# Patient Record
Sex: FEMALE | NOT HISPANIC OR LATINO | URBAN - METROPOLITAN AREA
[De-identification: names, ages, dates, MRNs, and addresses within clinical notes are randomized per-mention and may not be internally consistent; named-entity substitution may affect disease eponyms.]

---

## 2017-05-03 ENCOUNTER — APPOINTMENT (RX ONLY)
Dept: URBAN - METROPOLITAN AREA CLINIC 126 | Facility: CLINIC | Age: 37
Setting detail: DERMATOLOGY
End: 2017-05-03

## 2017-05-03 DIAGNOSIS — Z02.9 ENCOUNTER FOR ADMINISTRATIVE EXAMINATIONS, UNSPECIFIED: ICD-10-CM

## 2017-05-03 PROCEDURE — 99025: CPT

## 2022-03-30 ENCOUNTER — OFFICE VISIT (OUTPATIENT)
Dept: URBAN - METROPOLITAN AREA CLINIC 9 | Facility: CLINIC | Age: 42
End: 2022-03-30

## 2022-06-27 ENCOUNTER — OFFICE VISIT (OUTPATIENT)
Dept: URBAN - METROPOLITAN AREA CLINIC 9 | Facility: CLINIC | Age: 42
End: 2022-06-27

## 2022-06-27 ENCOUNTER — TELEPHONE ENCOUNTER (OUTPATIENT)
Dept: URBAN - METROPOLITAN AREA CLINIC 9 | Facility: CLINIC | Age: 42
End: 2022-06-27

## 2022-07-30 ENCOUNTER — TELEPHONE ENCOUNTER (OUTPATIENT)
Age: 42
End: 2022-07-30

## 2022-07-31 ENCOUNTER — TELEPHONE ENCOUNTER (OUTPATIENT)
Age: 42
End: 2022-07-31

## 2023-03-01 ENCOUNTER — APPOINTMENT (RX ONLY)
Dept: URBAN - METROPOLITAN AREA CLINIC 119 | Facility: CLINIC | Age: 43
Setting detail: DERMATOLOGY
End: 2023-03-01

## 2023-03-01 DIAGNOSIS — Z80.8 FAMILY HISTORY OF MALIGNANT NEOPLASM OF OTHER ORGANS OR SYSTEMS: ICD-10-CM

## 2023-03-01 DIAGNOSIS — L82.1 OTHER SEBORRHEIC KERATOSIS: ICD-10-CM

## 2023-03-01 DIAGNOSIS — D22 MELANOCYTIC NEVI: ICD-10-CM

## 2023-03-01 DIAGNOSIS — D18.0 HEMANGIOMA: ICD-10-CM

## 2023-03-01 DIAGNOSIS — Z85.820 PERSONAL HISTORY OF MALIGNANT MELANOMA OF SKIN: ICD-10-CM

## 2023-03-01 DIAGNOSIS — L82.0 INFLAMED SEBORRHEIC KERATOSIS: ICD-10-CM

## 2023-03-01 DIAGNOSIS — Z85.828 PERSONAL HISTORY OF OTHER MALIGNANT NEOPLASM OF SKIN: ICD-10-CM

## 2023-03-01 DIAGNOSIS — L57.8 OTHER SKIN CHANGES DUE TO CHRONIC EXPOSURE TO NONIONIZING RADIATION: ICD-10-CM

## 2023-03-01 DIAGNOSIS — L81.4 OTHER MELANIN HYPERPIGMENTATION: ICD-10-CM

## 2023-03-01 PROBLEM — D18.01 HEMANGIOMA OF SKIN AND SUBCUTANEOUS TISSUE: Status: ACTIVE | Noted: 2023-03-01

## 2023-03-01 PROBLEM — D22.9 MELANOCYTIC NEVI, UNSPECIFIED: Status: ACTIVE | Noted: 2023-03-01

## 2023-03-01 PROCEDURE — ? COUNSELING

## 2023-03-01 PROCEDURE — ? SUNSCREEN RECOMMENDATIONS

## 2023-03-01 PROCEDURE — ? LIQUID NITROGEN

## 2023-03-01 PROCEDURE — 99203 OFFICE O/P NEW LOW 30 MIN: CPT | Mod: 25

## 2023-03-01 PROCEDURE — 17110 DESTRUCTION B9 LES UP TO 14: CPT

## 2023-03-01 ASSESSMENT — LOCATION SIMPLE DESCRIPTION DERM
LOCATION SIMPLE: LEFT UPPER BACK
LOCATION SIMPLE: LEFT FOREHEAD
LOCATION SIMPLE: UPPER BACK
LOCATION SIMPLE: RIGHT SHOULDER
LOCATION SIMPLE: RIGHT UPPER BACK
LOCATION SIMPLE: RIGHT CHEEK
LOCATION SIMPLE: CHEST
LOCATION SIMPLE: LEFT SHOULDER

## 2023-03-01 ASSESSMENT — LOCATION DETAILED DESCRIPTION DERM
LOCATION DETAILED: RIGHT POSTERIOR SHOULDER
LOCATION DETAILED: LEFT MEDIAL UPPER BACK
LOCATION DETAILED: LEFT POSTERIOR SHOULDER
LOCATION DETAILED: INFERIOR THORACIC SPINE
LOCATION DETAILED: RIGHT MEDIAL UPPER BACK
LOCATION DETAILED: LEFT FOREHEAD
LOCATION DETAILED: STERNAL NOTCH
LOCATION DETAILED: RIGHT MEDIAL MALAR CHEEK

## 2023-03-01 ASSESSMENT — LOCATION ZONE DERM
LOCATION ZONE: TRUNK
LOCATION ZONE: TRUNK
LOCATION ZONE: ARM
LOCATION ZONE: FACE

## 2023-03-01 NOTE — PROCEDURE: LIQUID NITROGEN
Consent: The patient's consent was obtained including but not limited to risks of crusting, scabbing, blistering, scarring, darker or lighter pigmentary change, recurrence, incomplete removal and infection.
Post-Care Instructions: I reviewed with the patient in detail post-care instructions. Patient is to wear sunprotection, and avoid picking at any of the treated lesions. Pt may apply Vaseline to crusted or scabbing areas.
Include Z78.9 (Other Specified Conditions Influencing Health Status) As An Associated Diagnosis?: Yes
Spray Paint Technique: No
Medical Necessity Clause: This procedure was medically necessary because the lesions that were treated were:
Number Of Freeze-Thaw Cycles: 2 freeze-thaw cycles
Application Tool (Optional): Liquid Nitrogen Sprayer
Spray Paint Text: The liquid nitrogen was applied to the skin utilizing a spray paint frosting technique.
Detail Level: Detailed
Duration Of Freeze Thaw-Cycle (Seconds): 5-10
Medical Necessity Information: It is in your best interest to select a reason for this procedure from the list below. All of these items fulfill various CMS LCD requirements except the new and changing color options.

## 2024-03-21 ENCOUNTER — APPOINTMENT (RX ONLY)
Dept: URBAN - METROPOLITAN AREA CLINIC 119 | Facility: CLINIC | Age: 44
Setting detail: DERMATOLOGY
End: 2024-03-21

## 2024-03-21 DIAGNOSIS — L82.1 OTHER SEBORRHEIC KERATOSIS: ICD-10-CM

## 2024-03-21 DIAGNOSIS — D49.2 NEOPLASM OF UNSPECIFIED BEHAVIOR OF BONE, SOFT TISSUE, AND SKIN: ICD-10-CM

## 2024-03-21 PROCEDURE — 99213 OFFICE O/P EST LOW 20 MIN: CPT

## 2024-03-21 PROCEDURE — ? OBSERVATION

## 2024-03-21 PROCEDURE — ? COUNSELING

## 2024-03-21 ASSESSMENT — LOCATION ZONE DERM
LOCATION ZONE: TRUNK
LOCATION ZONE: NECK

## 2024-03-21 ASSESSMENT — LOCATION SIMPLE DESCRIPTION DERM
LOCATION SIMPLE: LEFT CLAVICULAR SKIN
LOCATION SIMPLE: POSTERIOR NECK
LOCATION SIMPLE: RIGHT CLAVICULAR SKIN

## 2024-03-21 ASSESSMENT — LOCATION DETAILED DESCRIPTION DERM
LOCATION DETAILED: LEFT CLAVICULAR SKIN
LOCATION DETAILED: RIGHT CLAVICULAR SKIN
LOCATION DETAILED: LEFT SUPERIOR POSTERIOR NECK
LOCATION DETAILED: LEFT POSTERIOR NECK

## 2024-03-21 NOTE — PROCEDURE: COUNSELING
Detail Level: Simple
Patient Specific Counseling (Will Not Stick From Patient To Patient): The differential diagnosis includes a benign mole or a seborrheic keratosis.\\nShave biopsy offered but patient declined and prefers to follow clinically.

## 2024-05-21 ENCOUNTER — APPOINTMENT (RX ONLY)
Dept: URBAN - METROPOLITAN AREA CLINIC 128 | Facility: CLINIC | Age: 44
Setting detail: DERMATOLOGY
End: 2024-05-21

## 2024-05-21 DIAGNOSIS — L82.1 OTHER SEBORRHEIC KERATOSIS: ICD-10-CM

## 2024-05-21 PROCEDURE — ? MEDLITE C6

## 2024-05-21 ASSESSMENT — LOCATION SIMPLE DESCRIPTION DERM
LOCATION SIMPLE: LEFT UPPER BACK
LOCATION SIMPLE: UPPER BACK
LOCATION SIMPLE: LEFT ANTERIOR NECK
LOCATION SIMPLE: RIGHT UPPER BACK
LOCATION SIMPLE: LEFT FOREHEAD
LOCATION SIMPLE: RIGHT SHOULDER
LOCATION SIMPLE: POSTERIOR NECK
LOCATION SIMPLE: LEFT SHOULDER
LOCATION SIMPLE: RIGHT BACK
LOCATION SIMPLE: TRAPEZIAL NECK

## 2024-05-21 ASSESSMENT — LOCATION DETAILED DESCRIPTION DERM
LOCATION DETAILED: LEFT POSTERIOR SHOULDER
LOCATION DETAILED: LEFT SUPERIOR UPPER BACK
LOCATION DETAILED: RIGHT MID-UPPER BACK
LOCATION DETAILED: LEFT CLAVICULAR NECK
LOCATION DETAILED: RIGHT SUPERIOR MEDIAL UPPER BACK
LOCATION DETAILED: LEFT ANTERIOR SHOULDER
LOCATION DETAILED: MID TRAPEZIAL NECK
LOCATION DETAILED: LEFT MEDIAL TRAPEZIAL NECK
LOCATION DETAILED: RIGHT POSTERIOR SHOULDER
LOCATION DETAILED: RIGHT SUPERIOR LATERAL UPPER BACK
LOCATION DETAILED: LEFT LATERAL FOREHEAD
LOCATION DETAILED: SUPERIOR THORACIC SPINE

## 2024-05-21 ASSESSMENT — LOCATION ZONE DERM
LOCATION ZONE: NECK
LOCATION ZONE: TRUNK
LOCATION ZONE: FACE
LOCATION ZONE: ARM

## 2024-05-21 NOTE — PROCEDURE: MEDLITE C6
Consent: Written consent obtained.  Risks reviewed including but not limited to erythema, swelling, crusting, scabbing, blistering, scarring, and darker or lighter pigmentary change.  Patient understands that results are not guaranteed and multiple treatments may be needed to achieve desired result.
Add Another Setting?: no
Procedure Note: Treatment was administered using the setting parameters listed above.
Repetition Rate (Hz): 10
Post-Care Instructions: I reviewed with the patient in detail post-care instructions. Patient should avoid sun exposure before and after treatment.  Diligent sunscreen use and sun avoidance advised.
Detail Level: Simple
Price (Use Numbers Only, No Special Characters Or $): 450
Wavelength: 532nm
Treatment Number: 1
Spot Size: 3mm
Number Of Prepaid Treatments (Will Not Render If 0): 0
Anesthesia Type: 1% lidocaine with epinephrine
Skin Type: III
Fluence (J/Cm2): 2

## 2024-05-24 ENCOUNTER — APPOINTMENT (RX ONLY)
Dept: URBAN - METROPOLITAN AREA CLINIC 128 | Facility: CLINIC | Age: 44
Setting detail: DERMATOLOGY
End: 2024-05-24

## 2024-05-24 DIAGNOSIS — I87.2 VENOUS INSUFFICIENCY (CHRONIC) (PERIPHERAL): ICD-10-CM | Status: WORSENING

## 2024-05-24 PROCEDURE — ? RECOMMENDATIONS

## 2024-05-24 PROCEDURE — ? PHOTO-DOCUMENTATION

## 2024-05-24 PROCEDURE — ? MEDICAL CONSULTATION: VENOUS DISEASE

## 2024-05-24 PROCEDURE — 99204 OFFICE O/P NEW MOD 45 MIN: CPT

## 2024-05-24 PROCEDURE — ? CEAP CLASSIFICATION

## 2024-05-24 PROCEDURE — ? ADDITIONAL NOTES

## 2024-05-24 PROCEDURE — ? VENOUS CLINICAL SEVERITY SCORE

## 2024-05-24 PROCEDURE — ? COMPRESSION STOCKING FITTING

## 2024-05-24 ASSESSMENT — LOCATION DETAILED DESCRIPTION DERM
LOCATION DETAILED: LEFT PROXIMAL PRETIBIAL REGION
LOCATION DETAILED: RIGHT DISTAL PRETIBIAL REGION

## 2024-05-24 ASSESSMENT — LOCATION SIMPLE DESCRIPTION DERM
LOCATION SIMPLE: RIGHT PRETIBIAL REGION
LOCATION SIMPLE: LEFT PRETIBIAL REGION

## 2024-05-24 ASSESSMENT — LOCATION ZONE DERM: LOCATION ZONE: LEG

## 2024-05-24 NOTE — PROCEDURE: MEDICAL CONSULTATION: VENOUS DISEASE
Left Leg Venous Hyperpigmentation: 0- None or focal low intensity (tan)
Clinical Classification Set 1: C0 - no visible or palpable signs of venous disease
Include Left Vcss In Note: Yes
Include Ceap In The Note?: No
Etiology Set 2: Ec - Congenital
Anatomy Set 2: As - Superficial Veins
Right Leg Ulcer Duration: 0- None
Right Leg Compression Therapy: 0- None or noncompliant
Detail Level: Zone
Pathophysiology Set 1: Pr - Reflux
Right Dorsalis Pedis Pulse: 2 (Easily palpable)
Right Leg: Peripheral Vascular Disease?: not assessed
Right Leg Circumference: medium
Limitations: Patient reports continuing daily activities and daily functions with symptoms of swelling, aching & cramping at night and is reported to be worse by days end and after activities.
Left Leg Venous Edema: 2- Afternoon edema above ankle
Right Leg Pain: 2- Daily, moderate activity limitation, occasional analgesics

## 2024-05-24 NOTE — PROCEDURE: RECOMMENDATIONS
Detail Level: Zone
Patient Management Risk Assessment: Moderate
Recommendation Preamble: The following recommendations were made during the visit:\\nPatient to be scheduled for bilateral lower extremity venous ultrasound.\\nPatient to follow up with Dr. Dick after ultrasound is completed to review US results.
Render Risk Assessment In Note?: no

## 2024-05-24 NOTE — HPI: VEIN EVALUATION
Is This A New Presentation, Or A Follow-Up?: Vein Evaluation
Additional History: She has worn compression stockings in the past 3 years ago. \\n\\nNo history of migraines \\nNo history of asthma \\nNo history of PFO \\nNo history of blood clot/DVT \\nNKDA \\nNo history of vein treatment \\nNot currently taking iron or doxycycline \\n\\nShe reports her cramping is a few times a week and it can disrupt her sleep.

## 2024-05-24 NOTE — PROCEDURE: CEAP CLASSIFICATION
Initial Location - Left Leg: left ankle
Initial Location - Right Leg: right ankle
Detail Level: Simple

## 2024-05-24 NOTE — PROCEDURE: COMPRESSION STOCKING FITTING
Right Thigh: 49
Order Date: 05/24/24
Stocking Type: Stockings
Left Below Knee: 40
Pantihose Type: Part A
Strength: 20-30 mmHg
Additional Instructions: She has medical grade compression stockings and will start her conservative management.
Detail Level: Simple
Toe Type: Open
Right Ankle: 25
Left Ankle: 24

## 2024-05-24 NOTE — PROCEDURE: ADDITIONAL NOTES
Additional Notes: Patient was informed that we will make a complimentary single pass with sclerotherapy while treating with Varithena, if additional treatment is needed we will offer a discounted rate of $450 one time for visual sclerotherapy (10cc) of solution at a separate appointment. If desired outcome is not achieved additional appointments will be charged $650 per session.
Render Risk Assessment In Note?: no
Detail Level: Simple

## 2024-06-28 ENCOUNTER — APPOINTMENT (RX ONLY)
Dept: URBAN - METROPOLITAN AREA CLINIC 128 | Facility: CLINIC | Age: 44
Setting detail: DERMATOLOGY
End: 2024-06-28